# Patient Record
Sex: FEMALE | URBAN - METROPOLITAN AREA
[De-identification: names, ages, dates, MRNs, and addresses within clinical notes are randomized per-mention and may not be internally consistent; named-entity substitution may affect disease eponyms.]

---

## 2017-03-22 PROBLEM — Z00.00 ENCOUNTER FOR PREVENTIVE HEALTH EXAMINATION: Status: ACTIVE | Noted: 2017-03-22

## 2023-07-29 ENCOUNTER — INPATIENT (INPATIENT)
Facility: HOSPITAL | Age: 78
LOS: 1 days | Discharge: HOME | End: 2023-07-31
Attending: HOSPITALIST | Admitting: HOSPITALIST
Payer: MEDICARE

## 2023-07-29 VITALS
TEMPERATURE: 98 F | HEIGHT: 63 IN | DIASTOLIC BLOOD PRESSURE: 85 MMHG | SYSTOLIC BLOOD PRESSURE: 176 MMHG | HEART RATE: 70 BPM | WEIGHT: 214.07 LBS | RESPIRATION RATE: 18 BRPM | OXYGEN SATURATION: 96 %

## 2023-07-29 LAB
ALBUMIN SERPL ELPH-MCNC: 3.4 G/DL — SIGNIFICANT CHANGE UP (ref 3.3–5)
ALP SERPL-CCNC: 96 U/L — SIGNIFICANT CHANGE UP (ref 40–120)
ALT FLD-CCNC: 13 U/L — SIGNIFICANT CHANGE UP (ref 12–78)
ANION GAP SERPL CALC-SCNC: 5 MMOL/L — SIGNIFICANT CHANGE UP (ref 5–17)
ANISOCYTOSIS BLD QL: SLIGHT — SIGNIFICANT CHANGE UP
APPEARANCE UR: CLEAR — SIGNIFICANT CHANGE UP
AST SERPL-CCNC: 11 U/L — LOW (ref 15–37)
BACTERIA # UR AUTO: ABNORMAL
BASOPHILS # BLD AUTO: 0.03 K/UL — SIGNIFICANT CHANGE UP (ref 0–0.2)
BASOPHILS NFR BLD AUTO: 0.3 % — SIGNIFICANT CHANGE UP (ref 0–2)
BILIRUB SERPL-MCNC: 1.4 MG/DL — HIGH (ref 0.2–1.2)
BILIRUB UR-MCNC: NEGATIVE — SIGNIFICANT CHANGE UP
BUN SERPL-MCNC: 17 MG/DL — SIGNIFICANT CHANGE UP (ref 7–23)
CALCIUM SERPL-MCNC: 8.6 MG/DL — SIGNIFICANT CHANGE UP (ref 8.5–10.1)
CHLORIDE SERPL-SCNC: 112 MMOL/L — HIGH (ref 96–108)
CO2 SERPL-SCNC: 28 MMOL/L — SIGNIFICANT CHANGE UP (ref 22–31)
COLOR SPEC: YELLOW — SIGNIFICANT CHANGE UP
CREAT SERPL-MCNC: 0.81 MG/DL — SIGNIFICANT CHANGE UP (ref 0.5–1.3)
DIFF PNL FLD: ABNORMAL
EGFR: 75 ML/MIN/1.73M2 — SIGNIFICANT CHANGE UP
EOSINOPHIL # BLD AUTO: 0.03 K/UL — SIGNIFICANT CHANGE UP (ref 0–0.5)
EOSINOPHIL NFR BLD AUTO: 0.3 % — SIGNIFICANT CHANGE UP (ref 0–6)
EPI CELLS # UR: SIGNIFICANT CHANGE UP
GLUCOSE SERPL-MCNC: 146 MG/DL — HIGH (ref 70–99)
GLUCOSE UR QL: NEGATIVE MG/DL — SIGNIFICANT CHANGE UP
HCT VFR BLD CALC: 26.1 % — LOW (ref 34.5–45)
HGB BLD-MCNC: 9.4 G/DL — LOW (ref 11.5–15.5)
HYPOCHROMIA BLD QL: SLIGHT — SIGNIFICANT CHANGE UP
IMM GRANULOCYTES NFR BLD AUTO: 0.4 % — SIGNIFICANT CHANGE UP (ref 0–0.9)
KETONES UR-MCNC: NEGATIVE — SIGNIFICANT CHANGE UP
LACTATE SERPL-SCNC: 1 MMOL/L — SIGNIFICANT CHANGE UP (ref 0.7–2)
LEUKOCYTE ESTERASE UR-ACNC: NEGATIVE — SIGNIFICANT CHANGE UP
LYMPHOCYTES # BLD AUTO: 1.91 K/UL — SIGNIFICANT CHANGE UP (ref 1–3.3)
LYMPHOCYTES # BLD AUTO: 16.4 % — SIGNIFICANT CHANGE UP (ref 13–44)
MACROCYTES BLD QL: SLIGHT — SIGNIFICANT CHANGE UP
MANUAL SMEAR VERIFICATION: SIGNIFICANT CHANGE UP
MCHC RBC-ENTMCNC: 23.7 PG — LOW (ref 27–34)
MCHC RBC-ENTMCNC: 36 G/DL — SIGNIFICANT CHANGE UP (ref 32–36)
MCV RBC AUTO: 65.9 FL — LOW (ref 80–100)
MICROCYTES BLD QL: SIGNIFICANT CHANGE UP
MONOCYTES # BLD AUTO: 0.6 K/UL — SIGNIFICANT CHANGE UP (ref 0–0.9)
MONOCYTES NFR BLD AUTO: 5.2 % — SIGNIFICANT CHANGE UP (ref 2–14)
NEUTROPHILS # BLD AUTO: 9 K/UL — HIGH (ref 1.8–7.4)
NEUTROPHILS NFR BLD AUTO: 77.4 % — HIGH (ref 43–77)
NITRITE UR-MCNC: NEGATIVE — SIGNIFICANT CHANGE UP
NRBC # BLD: 0 /100 WBCS — SIGNIFICANT CHANGE UP (ref 0–0)
OVALOCYTES BLD QL SMEAR: SLIGHT — SIGNIFICANT CHANGE UP
PH UR: 6 — SIGNIFICANT CHANGE UP (ref 5–8)
PLAT MORPH BLD: NORMAL — SIGNIFICANT CHANGE UP
PLATELET # BLD AUTO: 246 K/UL — SIGNIFICANT CHANGE UP (ref 150–400)
POLYCHROMASIA BLD QL SMEAR: SLIGHT — SIGNIFICANT CHANGE UP
POTASSIUM SERPL-MCNC: 3.6 MMOL/L — SIGNIFICANT CHANGE UP (ref 3.5–5.3)
POTASSIUM SERPL-SCNC: 3.6 MMOL/L — SIGNIFICANT CHANGE UP (ref 3.5–5.3)
PROT SERPL-MCNC: 6.5 GM/DL — SIGNIFICANT CHANGE UP (ref 6–8.3)
PROT UR-MCNC: NEGATIVE MG/DL — SIGNIFICANT CHANGE UP
RBC # BLD: 3.96 M/UL — SIGNIFICANT CHANGE UP (ref 3.8–5.2)
RBC # FLD: 17.6 % — HIGH (ref 10.3–14.5)
RBC BLD AUTO: ABNORMAL
RBC CASTS # UR COMP ASSIST: ABNORMAL /HPF (ref 0–4)
SODIUM SERPL-SCNC: 145 MMOL/L — SIGNIFICANT CHANGE UP (ref 135–145)
SP GR SPEC: 1.01 — SIGNIFICANT CHANGE UP (ref 1.01–1.02)
UROBILINOGEN FLD QL: NEGATIVE MG/DL — SIGNIFICANT CHANGE UP
WBC # BLD: 11.83 K/UL — HIGH (ref 3.8–10.5)
WBC # FLD AUTO: 11.83 K/UL — HIGH (ref 3.8–10.5)
WBC UR QL: SIGNIFICANT CHANGE UP

## 2023-07-29 PROCEDURE — 93010 ELECTROCARDIOGRAM REPORT: CPT

## 2023-07-29 PROCEDURE — 74177 CT ABD & PELVIS W/CONTRAST: CPT | Mod: 26,MC

## 2023-07-29 PROCEDURE — 99222 1ST HOSP IP/OBS MODERATE 55: CPT

## 2023-07-29 PROCEDURE — 99285 EMERGENCY DEPT VISIT HI MDM: CPT

## 2023-07-29 RX ORDER — TAMSULOSIN HYDROCHLORIDE 0.4 MG/1
0.4 CAPSULE ORAL DAILY
Refills: 0 | Status: DISCONTINUED | OUTPATIENT
Start: 2023-07-29 | End: 2023-07-31

## 2023-07-29 RX ORDER — MORPHINE SULFATE 50 MG/1
4 CAPSULE, EXTENDED RELEASE ORAL ONCE
Refills: 0 | Status: DISCONTINUED | OUTPATIENT
Start: 2023-07-29 | End: 2023-07-29

## 2023-07-29 RX ORDER — HEPARIN SODIUM 5000 [USP'U]/ML
5000 INJECTION INTRAVENOUS; SUBCUTANEOUS EVERY 12 HOURS
Refills: 0 | Status: DISCONTINUED | OUTPATIENT
Start: 2023-07-29 | End: 2023-07-31

## 2023-07-29 RX ORDER — KETOROLAC TROMETHAMINE 30 MG/ML
15 SYRINGE (ML) INJECTION ONCE
Refills: 0 | Status: DISCONTINUED | OUTPATIENT
Start: 2023-07-29 | End: 2023-07-29

## 2023-07-29 RX ORDER — LIDOCAINE 4 G/100G
1 CREAM TOPICAL ONCE
Refills: 0 | Status: COMPLETED | OUTPATIENT
Start: 2023-07-29 | End: 2023-07-29

## 2023-07-29 RX ORDER — SODIUM CHLORIDE 9 MG/ML
1000 INJECTION INTRAMUSCULAR; INTRAVENOUS; SUBCUTANEOUS
Refills: 0 | Status: DISCONTINUED | OUTPATIENT
Start: 2023-07-29 | End: 2023-07-31

## 2023-07-29 RX ORDER — SENNA PLUS 8.6 MG/1
2 TABLET ORAL AT BEDTIME
Refills: 0 | Status: DISCONTINUED | OUTPATIENT
Start: 2023-07-29 | End: 2023-07-31

## 2023-07-29 RX ORDER — SODIUM CHLORIDE 9 MG/ML
1000 INJECTION INTRAMUSCULAR; INTRAVENOUS; SUBCUTANEOUS ONCE
Refills: 0 | Status: COMPLETED | OUTPATIENT
Start: 2023-07-29 | End: 2023-07-29

## 2023-07-29 RX ORDER — HYDROMORPHONE HYDROCHLORIDE 2 MG/ML
0.2 INJECTION INTRAMUSCULAR; INTRAVENOUS; SUBCUTANEOUS EVERY 6 HOURS
Refills: 0 | Status: DISCONTINUED | OUTPATIENT
Start: 2023-07-29 | End: 2023-07-30

## 2023-07-29 RX ORDER — ONDANSETRON 8 MG/1
4 TABLET, FILM COATED ORAL ONCE
Refills: 0 | Status: COMPLETED | OUTPATIENT
Start: 2023-07-29 | End: 2023-07-29

## 2023-07-29 RX ORDER — CEFTRIAXONE 500 MG/1
1000 INJECTION, POWDER, FOR SOLUTION INTRAMUSCULAR; INTRAVENOUS EVERY 24 HOURS
Refills: 0 | Status: DISCONTINUED | OUTPATIENT
Start: 2023-07-29 | End: 2023-07-31

## 2023-07-29 RX ORDER — HYDROMORPHONE HYDROCHLORIDE 2 MG/ML
0.5 INJECTION INTRAMUSCULAR; INTRAVENOUS; SUBCUTANEOUS EVERY 6 HOURS
Refills: 0 | Status: DISCONTINUED | OUTPATIENT
Start: 2023-07-29 | End: 2023-07-31

## 2023-07-29 RX ADMIN — CEFTRIAXONE 100 MILLIGRAM(S): 500 INJECTION, POWDER, FOR SOLUTION INTRAMUSCULAR; INTRAVENOUS at 16:00

## 2023-07-29 RX ADMIN — HYDROMORPHONE HYDROCHLORIDE 0.5 MILLIGRAM(S): 2 INJECTION INTRAMUSCULAR; INTRAVENOUS; SUBCUTANEOUS at 16:03

## 2023-07-29 RX ADMIN — MORPHINE SULFATE 4 MILLIGRAM(S): 50 CAPSULE, EXTENDED RELEASE ORAL at 09:19

## 2023-07-29 RX ADMIN — Medication 15 MILLIGRAM(S): at 08:13

## 2023-07-29 RX ADMIN — LIDOCAINE 1 PATCH: 4 CREAM TOPICAL at 08:14

## 2023-07-29 RX ADMIN — HEPARIN SODIUM 5000 UNIT(S): 5000 INJECTION INTRAVENOUS; SUBCUTANEOUS at 18:53

## 2023-07-29 RX ADMIN — HYDROMORPHONE HYDROCHLORIDE 0.5 MILLIGRAM(S): 2 INJECTION INTRAMUSCULAR; INTRAVENOUS; SUBCUTANEOUS at 15:52

## 2023-07-29 RX ADMIN — SODIUM CHLORIDE 75 MILLILITER(S): 9 INJECTION INTRAMUSCULAR; INTRAVENOUS; SUBCUTANEOUS at 23:04

## 2023-07-29 RX ADMIN — LIDOCAINE 1 PATCH: 4 CREAM TOPICAL at 19:49

## 2023-07-29 RX ADMIN — MORPHINE SULFATE 4 MILLIGRAM(S): 50 CAPSULE, EXTENDED RELEASE ORAL at 09:04

## 2023-07-29 RX ADMIN — SODIUM CHLORIDE 1000 MILLILITER(S): 9 INJECTION INTRAMUSCULAR; INTRAVENOUS; SUBCUTANEOUS at 11:51

## 2023-07-29 RX ADMIN — ONDANSETRON 4 MILLIGRAM(S): 8 TABLET, FILM COATED ORAL at 07:31

## 2023-07-29 RX ADMIN — SENNA PLUS 2 TABLET(S): 8.6 TABLET ORAL at 23:04

## 2023-07-29 RX ADMIN — MORPHINE SULFATE 4 MILLIGRAM(S): 50 CAPSULE, EXTENDED RELEASE ORAL at 07:29

## 2023-07-29 RX ADMIN — Medication 15 MILLIGRAM(S): at 08:28

## 2023-07-29 RX ADMIN — SODIUM CHLORIDE 1000 MILLILITER(S): 9 INJECTION INTRAMUSCULAR; INTRAVENOUS; SUBCUTANEOUS at 08:31

## 2023-07-29 RX ADMIN — MORPHINE SULFATE 4 MILLIGRAM(S): 50 CAPSULE, EXTENDED RELEASE ORAL at 07:44

## 2023-07-29 RX ADMIN — LIDOCAINE 1 PATCH: 4 CREAM TOPICAL at 22:49

## 2023-07-29 RX ADMIN — TAMSULOSIN HYDROCHLORIDE 0.4 MILLIGRAM(S): 0.4 CAPSULE ORAL at 17:20

## 2023-07-29 RX ADMIN — SODIUM CHLORIDE 1000 MILLILITER(S): 9 INJECTION INTRAMUSCULAR; INTRAVENOUS; SUBCUTANEOUS at 07:31

## 2023-07-29 RX ADMIN — SODIUM CHLORIDE 1000 MILLILITER(S): 9 INJECTION INTRAMUSCULAR; INTRAVENOUS; SUBCUTANEOUS at 10:51

## 2023-07-29 NOTE — H&P ADULT - HISTORY OF PRESENT ILLNESS
78F     PMH asthma .  gastric  by pass,  cholecystectomy        c/o    L flank pain w/ radiation into LLQ since 0330 this   am    reports pain awoke her from sleep, denies previous similar pain,    had   2 episodes  of   vomiting   denies  fevers.  h/a, dizziness,   cp/sob,  cough, diarrhea / constipation, dysuria, hematuria, leg pain / swelling   Denies recent travel, sick contacts

## 2023-07-29 NOTE — H&P ADULT - NSHPPHYSICALEXAM_GEN_ALL_CORE
T(C): 36.8 (07-29-23 @ 10:46), Max: 36.8 (07-29-23 @ 10:46)  HR: 55 (07-29-23 @ 10:46) (55 - 70)  BP: 170/53 (07-29-23 @ 10:46) (169/69 - 176/85)  RR: 17 (07-29-23 @ 10:46) (17 - 18)  SpO2: 98% (07-29-23 @ 10:46) (96% - 98%)  GENERAL: NAD, lying in bed comfortably  HEAD:  Atraumatic, normocephalic  EYES: EOMI, PERRLA, conjunctiva and sclera clear  NECK: Supple, trachea midline, no JVD  HEART: Regular rate and rhythm, no murmurs, rubs, or gallops  LUNGS: Unlabored respirations.  Clear to auscultation bilaterally, no crackles, wheezing, or rhonchi  ABDOMEN: Soft, nontender, nondistended, +BS/  mild  flank  discomfirt  EXTREMITIES: 2+ peripheral pulses bilaterally. No clubbing, cyanosis, or edema  NERVOUS SYSTEM:  A&Ox3, moving all extremities, no focal deficits   SKIN: No rashes or lesions

## 2023-07-29 NOTE — ED ADULT NURSE NOTE - OBJECTIVE STATEMENT
80 YO female, A&Ox4, BIBA c/o L-side lower back pain radiating to LLQ abd since 3pm, woke patient from sleep. +nausea,  pt vomited x1 prior to arrival in Ed. Pain is rated 10/10, There is no associated chest pain, SOB, or back/flank pain. Abdomen non-distended, tender to palpation, Normoactive bowel sounds, denies any recent illness, change in diet, foreign travel, or sick contacts. 80 YO female, A&Ox4, BIBA c/o L-side lower back pain radiating to LLQ abd since 3pm, woke patient from sleep. +nausea,  pt vomited x1 prior to arrival in Ed. Pain is rated 10/10, There is no associated chest pain, SOB, or back/flank pain. Abdomen non-distended, tender to palpation, Normoactive bowel sounds, denies any recent illness, change in diet, foreign travel, or sick contacts. Per patient, no medical hx and NKDA 82 YO female, A&Ox4, BIBA c/o L-side lower back pain radiating to LLQ abd since 3pm, woke patient from sleep. +nausea,  pt vomited x1 prior to arrival in Ed. Pain is rated 10/10, There is no associated chest pain, SOB, or back/flank pain. Abdomen non-distended, tender to palpation, Normoactive bowel sounds, denies any recent illness, change in diet, foreign travel, or sick contacts. Per patient, no medical hx and NKDA 82 YO female, A&Ox4, BIBA c/o L-side lower back pain radiating to LLQ abd since 3pm, woke patient from sleep. +nausea,  pt vomited x1 prior to arrival in Ed. Pain is rated 10/10. There is no associated chest pain, SOB. Abdomen non-distended, tender to palpation, Normoactive bowel sounds, denies any recent illness, change in diet, foreign travel, or sick contacts. Per patient, no medical hx and NKDA 80 YO female, A&Ox4, BIBA c/o L-side lower back pain radiating to LLQ abd since 3pm, woke patient from sleep. +nausea,  pt vomited x1 prior to arrival in Ed. Pain is rated 10/10. There is no associated chest pain, SOB. Abdomen non-distended, tender to palpation, Normoactive bowel sounds, denies any recent illness, change in diet, foreign travel, or sick contacts. Per patient, no medical hx and NKDA

## 2023-07-29 NOTE — PATIENT PROFILE ADULT - FALL HARM RISK - HARM RISK INTERVENTIONS
Assistance with ambulation/Assistance OOB with selected safe patient handling equipment/Communicate Risk of Fall with Harm to all staff/Discuss with provider need for PT consult/Monitor gait and stability/Provide patient with walking aids - walker, cane, crutches/Reinforce activity limits and safety measures with patient and family/Tailored Fall Risk Interventions/Visual Cue: Yellow wristband and red socks/Bed in lowest position, wheels locked, appropriate side rails in place/Call bell, personal items and telephone in reach/Instruct patient to call for assistance before getting out of bed or chair/Non-slip footwear when patient is out of bed/[place] to call system/Physically safe environment - no spills, clutter or unnecessary equipment/Purposeful Proactive Rounding/Room/bathroom lighting operational, light cord in reach Assistance with ambulation/Assistance OOB with selected safe patient handling equipment/Communicate Risk of Fall with Harm to all staff/Discuss with provider need for PT consult/Monitor gait and stability/Provide patient with walking aids - walker, cane, crutches/Reinforce activity limits and safety measures with patient and family/Tailored Fall Risk Interventions/Visual Cue: Yellow wristband and red socks/Bed in lowest position, wheels locked, appropriate side rails in place/Call bell, personal items and telephone in reach/Instruct patient to call for assistance before getting out of bed or chair/Non-slip footwear when patient is out of bed/[place] to call system/Physically safe environment - no spills, clutter or unnecessary equipment/Purposeful Proactive Rounding/Room/bathroom lighting operational, light cord in reach Assistance with ambulation/Assistance OOB with selected safe patient handling equipment/Communicate Risk of Fall with Harm to all staff/Discuss with provider need for PT consult/Monitor gait and stability/Provide patient with walking aids - walker, cane, crutches/Reinforce activity limits and safety measures with patient and family/Tailored Fall Risk Interventions/Visual Cue: Yellow wristband and red socks/Bed in lowest position, wheels locked, appropriate side rails in place/Call bell, personal items and telephone in reach/Instruct patient to call for assistance before getting out of bed or chair/Non-slip footwear when patient is out of bed/[place] to call system/Physically safe environment - no spills, clutter or unnecessary equipment/Purposeful Proactive Rounding/Room/bathroom lighting operational, light cord in reach

## 2023-07-29 NOTE — CONSULT NOTE ADULT - ASSESSMENT
78F with 4.5mm stone in left UVJ,     recommend IVF  flomax  strain urine  continue rocephin  discussed with Dr Marquez

## 2023-07-29 NOTE — H&P ADULT - NSHPLABSRESULTS_GEN_ALL_CORE
LABS:                        9.4    11.83 )-----------( 246      ( 2023 07:30 )             26.1         145  |  112<H>  |  17  ----------------------------<  146<H>  3.6   |  28  |  0.81    Ca    8.6      2023 07:30    TPro  6.5  /  Alb  3.4  /  TBili  1.4<H>  /  DBili  x   /  AST  11<L>  /  ALT  13  /  AlkPhos  96            Urinalysis Basic - ( 2023 11:54 )    Color: Yellow / Appearance: Clear / S.010 / pH: x  Gluc: x / Ketone: Negative  / Bili: Negative / Urobili: Negative mg/dL   Blood: x / Protein: Negative mg/dL / Nitrite: Negative   Leuk Esterase: Negative / RBC: 6-10 /HPF / WBC 0-2   Sq Epi: x / Non Sq Epi: x / Bacteria: Few      Lactate, Blood: 1.0 mmol/L ( @ 07:30)

## 2023-07-29 NOTE — H&P ADULT - ASSESSMENT
78F       h/o asthma .  gastric  by pass,  cholecystectomy        c/o    L flank pain w/ radiation into LLQ since 0330 this   am    had   2 episodes  of   vomiting   denies  fevers.  h/a, dizziness,   cp/sob,  cough, diarrhea / constipation, dysuria, hematuria, leg pain / swelling     admitted with flank pain.  from renal stone   Ct a/p,.  mild  left hydro , 4.5  mm  stone  left UVJ  stone, IPMN, . splenomegaly      urology called  by er        iv fluids/ pain meds  Anemia.  ? new    splenomegaly/  heme  eval on monday  HTN   on    enlapril  pancreatic   IPMN.  mri  abd   on dvt ppx/  s/q  heparin       rad< from: CT Abdomen and Pelvis w/ IV Cont (07.29.23 @ 08:38) >  PRESSION:  There is mild left hydroureteronephrosis with left perinephric and left   periureteral stranding secondary to an obstructing 4.5 mm stone in the   left UVJ.  Multiple lobular areas of low attenuation involving the pancreatic body   and tail interspersed with normal pancreatic parenchyma suggestive of   IPMN (intraductal papillary mucinous neoplasm). Recommend further   evaluation with MRI of the pancreas with and without intravenous contrast  --- End of Report ---              78F       h/o asthma .  gastric  by pass,  cholecystectomy        c/o    L flank pain w/ radiation into LLQ since 0330 this   am    had   2 episodes  of   vomiting   denies  fevers.  h/a, dizziness,   cp/sob,  cough, diarrhea / constipation, dysuria, hematuria, leg pain / swelling     admitted with flank pain.  from renal stone   Ct a/p,.  mild  left hydro , 4.5  mm  stone  left UVJ  stone, IPMN, . splenomegaly      urology called  by er        iv fluids/ pain meds/  iv rocephin , for   nick nephric  stranding  Anemia.  ? new    iron studies,   stool guiac    splenomegaly/  heme  eval on monday  HTN   on    enlapril  pancreatic   IPMN.  mri  abd   on dvt ppx/  s/q  heparin       rad< from: CT Abdomen and Pelvis w/ IV Cont (07.29.23 @ 08:38) >  PRESSION:  There is mild left hydroureteronephrosis with left perinephric and left   periureteral stranding secondary to an obstructing 4.5 mm stone in the   left UVJ.  Multiple lobular areas of low attenuation involving the pancreatic body   and tail interspersed with normal pancreatic parenchyma suggestive of   IPMN (intraductal papillary mucinous neoplasm). Recommend further   evaluation with MRI of the pancreas with and without intravenous contrast  --- End of Report ---

## 2023-07-29 NOTE — ED PROVIDER NOTE - PHYSICAL EXAMINATION
GEN: Awake, alert, interactive, pt appears uncomfortable / moaning in pain.   HEAD AND NECK: NC/AT. Airway patent. Neck supple.   EYES:  Clear b/l. EOMI. PERRL.   ENT: Moist mucus membranes.   CARDIAC: Regular rate, regular rhythm. No evident pedal edema.    RESP/CHEST: Normal respiratory effort with no use of accessory muscles or retractions. Clear throughout on auscultation.  ABD: Soft, non-distended,+ TTP LLQ. No rebound, no guarding.   BACK: No midline spinal TTP. + L CVAT.   EXTREMITIES: Moving all extremities with no apparent deformities.   SKIN: Warm, dry, intact normal color. No rash.   NEURO: AOx3, CN II-XII grossly intact, no focal deficits.   PSYCH: Appropriate mood and affect.

## 2023-07-29 NOTE — ED ADULT TRIAGE NOTE - NS ED NURSE AMBULANCES
[facility], Ambulance Department [facility], Ambulance Department [facility] [place], Ambulance Department

## 2023-07-29 NOTE — ED PROVIDER NOTE - OBJECTIVE STATEMENT
78F PMH asthma BIBEMS d/t severe L flank pain w/ radiation into LLQ since 330 this AM. Pt reports pain awoke her from sleep, denies previous similar pain, pain 10/10, constant. Pt reports 2 episodes NBNB emesis. Denies F/C, h/a, dizziness, CP, SOB, cough, diarrhea / constipation, dysuria, hematuria, LE pain / swelling. Denies recent travel, sick contacts, new / spoiled foods.     PMH as above, PSH gastric bypass, megha, , 'tumor removal,' NKDA, meds as listed.

## 2023-07-29 NOTE — H&P ADULT - NSHPREVIEWOFSYSTEMS_GEN_ALL_CORE
REVIEW OF SYSTEMS:  CONSTITUTIONAL: No fever,  no  weight loss  ENT:  No  tinnitus,   no   vertigo  NECK: No pain or stiffness  RESPIRATORY: No cough, wheezing, chills or hemoptysis;    No Shortness of Breath  CARDIOVASCULAR: No chest pain, palpitations, dizziness  GASTROINTESTINAL: No abdominal or epigastric pain. No nausea, vomiting, or hematemesis; No diarrhea  No melena or hematochezia.  GENITOURINARY: No dysuria, frequency, hematuria, or incontinence/  +  flank pain  NEUROLOGICAL: No headaches  SKIN: No itching,  no   rash  LYMPH Nodes: No enlarged glands  ENDOCRINE: No heat or cold intolerance  MUSCULOSKELETAL: No joint pain or swelling  PSYCHIATRIC: No depression, anxiety  HEME/LYMPH: No easy bruising, or bleeding gums  ALLERGY AND IMMUNOLOGIC: No hives or eczema

## 2023-07-29 NOTE — ED ADULT NURSE NOTE - NSFALLUNIVINTERV_ED_ALL_ED
Bed/Stretcher in lowest position, wheels locked, appropriate side rails in place/Call bell, personal items and telephone in reach/Instruct patient to call for assistance before getting out of bed/chair/stretcher/Non-slip footwear applied when patient is off stretcher/[place] to call system/Physically safe environment - no spills, clutter or unnecessary equipment/Purposeful proactive rounding/Room/bathroom lighting operational, light cord in reach Bed/Stretcher in lowest position, wheels locked, appropriate side rails in place/Call bell, personal items and telephone in reach/Instruct patient to call for assistance before getting out of bed/chair/stretcher/Non-slip footwear applied when patient is off stretcher/[place] to call system/Physically safe environment - no spills, clutter or unnecessary equipment/Purposeful proactive rounding/Room/bathroom lighting operational, light cord in reach Bed/Stretcher in lowest position, wheels locked, appropriate side rails in place/Call bell, personal items and telephone in reach/Instruct patient to call for assistance before getting out of bed/chair/stretcher/Non-slip footwear applied when patient is off stretcher/[place] to call system/Physically safe environment - no spills, clutter or unnecessary equipment/Purposeful proactive rounding/Room/bathroom lighting operational, light cord in reach

## 2023-07-29 NOTE — ED ADULT TRIAGE NOTE - CHIEF COMPLAINT QUOTE
BIBA,  pt c/o L-side lower back pain radiating to LLQ abd since 3pm, woke patient from sleep. +nausea,  pt vomited x1 prior to arrival in Ed.

## 2023-07-29 NOTE — CONSULT NOTE ADULT - SUBJECTIVE AND OBJECTIVE BOX
Chief Complaint:  Patient is a 78y old  Female who presents with a chief complaint of flank pain (2023 14:07)    HPI:  78F PMH asthma gastric  by pass, cholecystectomy c/o L flank pain w/ radiation into LLQ since 0330 this am  reports pain awoke her from sleep, denies previous similar pain, had 2 episodes  of vomiting denies  fevers.  h/a, dizziness,   cp/sob,  cough, diarrhea / constipation, dysuria, hematuria, leg pain / swelling   Denies recent travel, sick contacts   (2023 14:07)      PMH/PSH:PAST MEDICAL & SURGICAL HISTORY:      Allergies:  No Known Allergies      Medications:  cefTRIAXone   IVPB 1000 milliGRAM(s) IV Intermittent every 24 hours  enalapril 2.5 milliGRAM(s) Oral daily  heparin   Injectable 5000 Unit(s) SubCutaneous every 12 hours  HYDROmorphone  Injectable 0.2 milliGRAM(s) IV Push every 6 hours PRN  HYDROmorphone  Injectable 0.5 milliGRAM(s) IV Push every 6 hours PRN  senna 2 Tablet(s) Oral at bedtime  sodium chloride 0.9%. 1000 milliLiter(s) IV Continuous <Continuous>  tamsulosin 0.4 milliGRAM(s) Oral daily      REVIEW OF SYSTEMS:  All other review of systems is negative unless indicated above.    Relevant Family History:   FAMILY HISTORY:      Relevant Social History:  Denies ETOH or tobacco history    Physical Exam:    Vital Signs:  Vital Signs Last 24 Hrs  T(C): 36.6 (2023 18:45), Max: 37.2 (2023 15:49)  T(F): 97.9 (2023 18:45), Max: 98.9 (2023 15:49)  HR: 49 (2023 18:45) (49 - 70)  BP: 182/70 (2023 18:45) (121/67 - 182/70)  BP(mean): --  RR: 20 (2023 18:45) (17 - 20)  SpO2: 94% (2023 18:45) (94% - 98%)    Parameters below as of 2023 18:45  Patient On (Oxygen Delivery Method): room air      Daily Height in cm: 160.02 (2023 06:35)    Daily   GENERAL: NAD, lying in bed comfortably  HEAD:  Atraumatic, normocephalic  EYES: EOMI, PERRLA, conjunctiva and sclera clear  NECK: Supple, trachea midline, no JVD  HEART: Regular rate and rhythm, no murmurs, rubs, or gallops  LUNGS: Unlabored respirations.  Clear to auscultation bilaterally, no crackles, wheezing, or rhonchi  ABDOMEN: Soft, nontender, nondistended, +BS/  mild  flank  discomfirt  EXTREMITIES: 2+ peripheral pulses bilaterally. No clubbing, cyanosis, or edema  NERVOUS SYSTEM:  A&Ox3, moving all extremities, no focal deficits       Laboratory:                          9.4    11.83 )-----------( 246      ( 2023 07:30 )             26.1         145  |  112<H>  |  17  ----------------------------<  146<H>  3.6   |  28  |  0.81    Ca    8.6      2023 07:30    TPro  6.5  /  Alb  3.4  /  TBili  1.4<H>  /  DBili  x   /  AST  11<L>  /  ALT  13  /  AlkPhos  96      LIVER FUNCTIONS - ( 2023 07:30 )  Alb: 3.4 g/dL / Pro: 6.5 gm/dL / ALK PHOS: 96 U/L / ALT: 13 U/L / AST: 11 U/L / GGT: x             Urinalysis Basic - ( 2023 11:54 )    Color: Yellow / Appearance: Clear / S.010 / pH: x  Gluc: x / Ketone: Negative  / Bili: Negative / Urobili: Negative mg/dL   Blood: x / Protein: Negative mg/dL / Nitrite: Negative   Leuk Esterase: Negative / RBC: 6-10 /HPF / WBC 0-2   Sq Epi: x / Non Sq Epi: x / Bacteria: Few          Intake and Output    23 @ 07:01  -  23 @ 20:02  --------------------------------------------------------  IN: 2000 mL / OUT: 800 mL / NET: 1200 mL        Imaging:    < from: CT Abdomen and Pelvis w/ IV Cont (23 @ 08:38) >  CONTRAST/COMPLICATIONS:  IV Contrast: Omnipaque 350  99 cc administered   1 cc discarded  Oral Contrast: NONE  Complications: None reported at time of study completion      PROCEDURE:  Axial CT images were acquired through the abdomen and pelvis following   the administration of intravenous contrast. Coronal and sagittal   reformatted images were generated.    LOWER CHEST: Visualized lung bases demonstrate mild dependent changes.  LIVER: Within normal limits.  BILE DUCTS: Normal caliber.  GALLBLADDER: Cholecystectomy.  SPLEEN: Enlarged spleen measures 14.8 cm CC.  PANCREAS: Multiple lobular areas of low attenuation involving the   pancreatic body and tail interspersed with normal pancreatic parenchyma   suggestive of IPMN.  ADRENALS: Within normal limits.  KIDNEYS/URETERS: There is mild left hydroureteronephrosis with left   perinephric and left periureteral stranding secondary to an obstructing   4.5 mm stone in the left UVJ.    BLADDER: No bladder wall thickening.  REPRODUCTIVE ORGANS: Hysterectomy.    BOWEL: Evaluation of bowel is limited without distention with oral   contrast, however there is no bowel obstruction. Colonic diverticulosis   without acute diverticulitis. Appendiceal stump is within normal limits.   Small bowel loops are not dilated. Status post gastric bypass surgery.   Stomach is underdistended for adequate evaluation.  PERITONEUM: No free air or significant ascites.  VESSELS:  No abdominal aortic aneurysm or dissection.  RETROPERITONEUM: No lymphadenopathy.  ABDOMINAL WALL: Small fat-containing umbilical hernia containing a wall   of nonobstructed/nonstrangulated small bowel. Fatty atrophy of the right   rectus abdominis muscle.  BONES: Mild degenerative changes.      IMPRESSION:    There is mild left hydroureteronephrosis with left perinephric and left   periureteral stranding secondary to an obstructing 4.5 mm stone in the   left UVJ.    Multiple lobular areas of low attenuation involving the pancreatic body   and tail interspersed with normal pancreatic parenchyma suggestive of   IPMN (intraductal papillary mucinous neoplasm). Recommend further   evaluation with MRI of the pancreas with and without intravenous contrast.      --- End of Report ---    < end of copied text >

## 2023-07-29 NOTE — ED PROVIDER NOTE - CLINICAL SUMMARY MEDICAL DECISION MAKING FREE TEXT BOX
78F PMH asthma BIBEMS d/t severe L flank pain w/ radiation into LLQ onset this AM. Afebrile, VSS. Pt appears uncomfortable / in pain. + L CVAT / LLQ TTP. Plan for CBC, CMP, lactate, UA/C, CT AP. Give IVF, Morphine, Zofran. Re-eval. 78F PMH asthma BIBEMS d/t severe L flank pain w/ radiation into LLQ onset this AM. Afebrile, VSS. Pt appears uncomfortable / in pain. + L CVAT / LLQ TTP. Plan for CBC, CMP, lactate, UA/C, CT AP. Give IVF, Morphine, Toradol, Zofran. Re-eval.  W/u significant for: + mild leukocytosis to 11.8, CT w/ 4.5mm obstructing stone w/in L UVJ. UA w/o evidence infection. On re-eval, pt w/ continued pain s/p 4mg Morphine & 15mg Toradol, additional medications ordered. Shared decision making w/ pt, family: Admission v d/c home w/ close outpatient Uro f/u. Pt & family prefer admission d/t pt age, pain control. Will admit to medicine (d/w Dr Cano), Uro consulted. Pt, family updated to results, admission. They understand / agree w/ this plan.

## 2023-07-30 LAB
ANION GAP SERPL CALC-SCNC: 2 MMOL/L — LOW (ref 5–17)
BUN SERPL-MCNC: 11 MG/DL — SIGNIFICANT CHANGE UP (ref 7–23)
CALCIUM SERPL-MCNC: 8.3 MG/DL — LOW (ref 8.5–10.1)
CHLORIDE SERPL-SCNC: 114 MMOL/L — HIGH (ref 96–108)
CK MB BLD-MCNC: <3.2 % — SIGNIFICANT CHANGE UP (ref 0–3.5)
CK MB CFR SERPL CALC: <1 NG/ML — SIGNIFICANT CHANGE UP (ref 0.5–3.6)
CK SERPL-CCNC: 31 U/L — SIGNIFICANT CHANGE UP (ref 26–192)
CO2 SERPL-SCNC: 30 MMOL/L — SIGNIFICANT CHANGE UP (ref 22–31)
CREAT SERPL-MCNC: 0.57 MG/DL — SIGNIFICANT CHANGE UP (ref 0.5–1.3)
CULTURE RESULTS: NO GROWTH — SIGNIFICANT CHANGE UP
EGFR: 94 ML/MIN/1.73M2 — SIGNIFICANT CHANGE UP
FERRITIN SERPL-MCNC: 57 NG/ML — SIGNIFICANT CHANGE UP (ref 13–330)
GLUCOSE SERPL-MCNC: 95 MG/DL — SIGNIFICANT CHANGE UP (ref 70–99)
HCT VFR BLD CALC: 23.6 % — LOW (ref 34.5–45)
HCV AB S/CO SERPL IA: 0.07 S/CO — SIGNIFICANT CHANGE UP (ref 0–0.99)
HCV AB SERPL-IMP: SIGNIFICANT CHANGE UP
HGB BLD-MCNC: 8.4 G/DL — LOW (ref 11.5–15.5)
IRON SATN MFR SERPL: 17 % — SIGNIFICANT CHANGE UP (ref 14–50)
IRON SATN MFR SERPL: 40 UG/DL — SIGNIFICANT CHANGE UP (ref 30–160)
MCHC RBC-ENTMCNC: 23.7 PG — LOW (ref 27–34)
MCHC RBC-ENTMCNC: 35.6 G/DL — SIGNIFICANT CHANGE UP (ref 32–36)
MCV RBC AUTO: 66.7 FL — LOW (ref 80–100)
NRBC # BLD: 0 /100 WBCS — SIGNIFICANT CHANGE UP (ref 0–0)
PLATELET # BLD AUTO: 213 K/UL — SIGNIFICANT CHANGE UP (ref 150–400)
POTASSIUM SERPL-MCNC: 3.8 MMOL/L — SIGNIFICANT CHANGE UP (ref 3.5–5.3)
POTASSIUM SERPL-SCNC: 3.8 MMOL/L — SIGNIFICANT CHANGE UP (ref 3.5–5.3)
RBC # BLD: 3.54 M/UL — LOW (ref 3.8–5.2)
RBC # FLD: 17.7 % — HIGH (ref 10.3–14.5)
SODIUM SERPL-SCNC: 146 MMOL/L — HIGH (ref 135–145)
SPECIMEN SOURCE: SIGNIFICANT CHANGE UP
TIBC SERPL-MCNC: 235 UG/DL — SIGNIFICANT CHANGE UP (ref 220–430)
TROPONIN I, HIGH SENSITIVITY RESULT: 18.6 NG/L — SIGNIFICANT CHANGE UP
TROPONIN I, HIGH SENSITIVITY RESULT: 31.1 NG/L — SIGNIFICANT CHANGE UP
UIBC SERPL-MCNC: 195 UG/DL — SIGNIFICANT CHANGE UP (ref 110–370)
WBC # BLD: 9.03 K/UL — SIGNIFICANT CHANGE UP (ref 3.8–10.5)
WBC # FLD AUTO: 9.03 K/UL — SIGNIFICANT CHANGE UP (ref 3.8–10.5)

## 2023-07-30 PROCEDURE — 99233 SBSQ HOSP IP/OBS HIGH 50: CPT

## 2023-07-30 PROCEDURE — 93010 ELECTROCARDIOGRAM REPORT: CPT

## 2023-07-30 RX ORDER — PREGABALIN 225 MG/1
1000 CAPSULE ORAL ONCE
Refills: 0 | Status: COMPLETED | OUTPATIENT
Start: 2023-07-30 | End: 2023-07-30

## 2023-07-30 RX ORDER — ALBUTEROL 90 UG/1
2 AEROSOL, METERED ORAL EVERY 6 HOURS
Refills: 0 | Status: DISCONTINUED | OUTPATIENT
Start: 2023-07-30 | End: 2023-07-31

## 2023-07-30 RX ORDER — ALBUTEROL 90 UG/1
2 AEROSOL, METERED ORAL EVERY 6 HOURS
Refills: 0 | Status: COMPLETED | OUTPATIENT
Start: 2023-07-30 | End: 2024-06-27

## 2023-07-30 RX ORDER — IRON SUCROSE 20 MG/ML
200 INJECTION, SOLUTION INTRAVENOUS ONCE
Refills: 0 | Status: COMPLETED | OUTPATIENT
Start: 2023-07-30 | End: 2023-07-30

## 2023-07-30 RX ORDER — FOLIC ACID 0.8 MG
1 TABLET ORAL ONCE
Refills: 0 | Status: COMPLETED | OUTPATIENT
Start: 2023-07-30 | End: 2023-07-30

## 2023-07-30 RX ORDER — PANTOPRAZOLE SODIUM 20 MG/1
40 TABLET, DELAYED RELEASE ORAL EVERY 12 HOURS
Refills: 0 | Status: DISCONTINUED | OUTPATIENT
Start: 2023-07-30 | End: 2023-07-31

## 2023-07-30 RX ORDER — SUCRALFATE 1 G
1 TABLET ORAL EVERY 6 HOURS
Refills: 0 | Status: DISCONTINUED | OUTPATIENT
Start: 2023-07-30 | End: 2023-07-31

## 2023-07-30 RX ADMIN — Medication 2.5 MILLIGRAM(S): at 05:18

## 2023-07-30 RX ADMIN — TAMSULOSIN HYDROCHLORIDE 0.4 MILLIGRAM(S): 0.4 CAPSULE ORAL at 12:18

## 2023-07-30 RX ADMIN — PANTOPRAZOLE SODIUM 40 MILLIGRAM(S): 20 TABLET, DELAYED RELEASE ORAL at 20:51

## 2023-07-30 RX ADMIN — IRON SUCROSE 200 MILLIGRAM(S): 20 INJECTION, SOLUTION INTRAVENOUS at 10:11

## 2023-07-30 RX ADMIN — HYDROMORPHONE HYDROCHLORIDE 0.5 MILLIGRAM(S): 2 INJECTION INTRAMUSCULAR; INTRAVENOUS; SUBCUTANEOUS at 10:05

## 2023-07-30 RX ADMIN — PANTOPRAZOLE SODIUM 40 MILLIGRAM(S): 20 TABLET, DELAYED RELEASE ORAL at 10:23

## 2023-07-30 RX ADMIN — Medication 1 MILLIGRAM(S): at 10:23

## 2023-07-30 RX ADMIN — HEPARIN SODIUM 5000 UNIT(S): 5000 INJECTION INTRAVENOUS; SUBCUTANEOUS at 05:18

## 2023-07-30 RX ADMIN — PREGABALIN 1000 MICROGRAM(S): 225 CAPSULE ORAL at 10:20

## 2023-07-30 RX ADMIN — Medication 30 MILLILITER(S): at 09:17

## 2023-07-30 RX ADMIN — SENNA PLUS 2 TABLET(S): 8.6 TABLET ORAL at 21:55

## 2023-07-30 RX ADMIN — CEFTRIAXONE 100 MILLIGRAM(S): 500 INJECTION, POWDER, FOR SOLUTION INTRAMUSCULAR; INTRAVENOUS at 16:17

## 2023-07-30 RX ADMIN — SODIUM CHLORIDE 100 MILLILITER(S): 9 INJECTION INTRAMUSCULAR; INTRAVENOUS; SUBCUTANEOUS at 23:30

## 2023-07-30 RX ADMIN — HYDROMORPHONE HYDROCHLORIDE 0.5 MILLIGRAM(S): 2 INJECTION INTRAMUSCULAR; INTRAVENOUS; SUBCUTANEOUS at 08:39

## 2023-07-30 RX ADMIN — Medication 1 GRAM(S): at 19:18

## 2023-07-30 RX ADMIN — HEPARIN SODIUM 5000 UNIT(S): 5000 INJECTION INTRAVENOUS; SUBCUTANEOUS at 18:23

## 2023-07-30 RX ADMIN — Medication 1 GRAM(S): at 10:55

## 2023-07-30 RX ADMIN — Medication 1 GRAM(S): at 23:30

## 2023-07-30 NOTE — PROGRESS NOTE ADULT - SUBJECTIVE AND OBJECTIVE BOX
Patient seen and examined  reports left flank pain  urinating  no blood  vitals stable  hb 8.4  on IVF  on IV rocephin for empiric therapy  Review of Systems:  General:denies fever chills, headache, weakness  HEENT: denies blurry vision,diffculty swallowing, difficulty hearing, tinnitus  Cardiovascular: denies chest pain  ,palpitations  Pulmonary:denies shortness of breath, cough, wheezing, hemoptysis  Gastrointestinal: denies abdominal pain, constipation, diarrhea,nausea , vomiting, hematochezia  : denies hematuria, dysuria, or incontinence  Neurological: denies weakness, numbness , tingling, dizziness, tremors  MSK: denies muscle pain, difficulty ambulating, swelling, back pain  skin: denies skin rash, itching, burning, or  skin lesions  Psychiatrical: denies mood disturbances, anxierty, feeling depressed, depression , or difficulty sleeping    Objective:  Vitals  T(C): 37 (07-30-23 @ 08:33), Max: 37.2 (07-29-23 @ 15:49)  HR: 58 (07-30-23 @ 08:33) (49 - 64)  BP: 166/73 (07-30-23 @ 08:33) (121/67 - 182/70)  RR: 17 (07-30-23 @ 08:33) (17 - 20)  SpO2: 92% (07-30-23 @ 08:33) (92% - 98%)    Physical Exam:  General: comfortable, no acute distress  HEENT: Atraumatic, no LAD, trachea midline, PERRLA  Cardiovascular: normal s1s2, no murmurs, gallops or fricition rubs  Pulmonary: clear to ausculation Bilaterally, no wheezing , rhonchi  Gastrointestinal: soft non tender non distended, no masses felt, no organomegally  Muscloskeletal: no lower extremity edema, intact bilateral lower extremity pulses  Neurological: CN II-12 intact. No focal weakness  Psychiatrical: normal mood, cooperative  SKIN: no rash, lesions or ulcers    Labs:                          8.4    9.03  )-----------( 213      ( 30 Jul 2023 07:20 )             23.6     07-30    146<H>  |  114<H>  |  11  ----------------------------<  95  3.8   |  30  |  0.57    Ca    8.3<L>      30 Jul 2023 07:20    TPro  6.5  /  Alb  3.4  /  TBili  1.4<H>  /  DBili  x   /  AST  11<L>  /  ALT  13  /  AlkPhos  96  07-29    LIVER FUNCTIONS - ( 29 Jul 2023 07:30 )  Alb: 3.4 g/dL / Pro: 6.5 gm/dL / ALK PHOS: 96 U/L / ALT: 13 U/L / AST: 11 U/L / GGT: x                 Active Medications  MEDICATIONS  (STANDING):  aluminum hydroxide/magnesium hydroxide/simethicone Suspension 30 milliLiter(s) Oral once  cefTRIAXone   IVPB 1000 milliGRAM(s) IV Intermittent every 24 hours  enalapril 2.5 milliGRAM(s) Oral daily  heparin   Injectable 5000 Unit(s) SubCutaneous every 12 hours  pantoprazole    Tablet 40 milliGRAM(s) Oral every 12 hours  senna 2 Tablet(s) Oral at bedtime  sodium chloride 0.9%. 1000 milliLiter(s) (75 mL/Hr) IV Continuous <Continuous>  sucralfate 1 Gram(s) Oral every 6 hours  tamsulosin 0.4 milliGRAM(s) Oral daily    MEDICATIONS  (PRN):  HYDROmorphone  Injectable 0.5 milliGRAM(s) IV Push every 6 hours PRN Severe Pain (7 - 10)  HYDROmorphone  Injectable 0.2 milliGRAM(s) IV Push every 6 hours PRN Moderate Pain (4 - 6)

## 2023-07-30 NOTE — PROGRESS NOTE ADULT - ASSESSMENT
77 yo F with h/o asthma .  gastric  by pass,  cholecystectomy  c/o    L flank pain w/ radiation into LLQ with   2 episodes  of   vomiting. Imaging consistent with left sided renal stone and  mild  left hydro and associated nick nephric  stranding. also found to be Anemic. admitted to Med /surg for further evaluation      LLQ pain  due to left sided nephrolithiases / left hydronephrosis and likely left sided pyelonephritis  Urology consulted  plan:  IVF  rocephim  monitor stone passage  flomax  f/u cultures    Chest/epigastric pain  doubtful ACS  EKG benign, trops pending  plan:  protonix carafate reassess    Anemia likely Fe deficiency  hb 8.4 now   no active bleeding  plan  start fe / b12/folic acid      HTN: enalapril    Pancreatic   IPMN. NO MRI in house , will touch base with GI if necessary in house    dvt ppx:   s/q  heparin                   79 yo F with h/o asthma .  gastric  by pass,  cholecystectomy  c/o    L flank pain w/ radiation into LLQ with   2 episodes  of   vomiting. Imaging consistent with left sided renal stone and  mild  left hydro and associated nick nephric  stranding. also found to be Anemic. admitted to Med /surg for further evaluation      LLQ pain  due to left sided nephrolithiases / left hydronephrosis and likely left sided pyelonephritis  Urology consulted  plan:  IVF  rocephim  monitor stone passage  flomax  f/u cultures    Chest/epigastric pain  doubtful ACS  EKG benign, trops pending  plan:  protonix carafate reassess    Anemia likely Fe deficiency  hb 8.4 now   no active bleeding  plan  start fe / b12/folic acid      HTN: enalapril    Pancreatic   IPMN. NO MRI in house , will touch base with GI if necessary in house    dvt ppx:   s/q  heparin

## 2023-07-30 NOTE — CHART NOTE - NSCHARTNOTEFT_GEN_A_CORE
Medicine PA Note    Notified by Rn that patient's IV was infiltrated and removed by the Daytime RN prior to shift starting. Patient complaining of pain above the site where IV was placed. Right antecubital fossa swollen with ecchymosis to area above,  2 small skin tear on either side of fossa , tenderness to palp , flex and ext of rt elbow decreased due to pain. Brachial pulse + 2  , radial pulse + 2 , Sensory Intact. Capillary refill of rt hand ,2 sec. Ice compresses and elevation. patient has pain medication ordered. will continue to monitor the patient.     [facility] Medicine PA Note    Notified by Rn that patient's IV was infiltrated and removed by the Daytime RN prior to shift starting. Patient complaining of pain above the site where IV was placed. Right antecubital fossa swollen with ecchymosis to area above,  2 small skin tear on either side of fossa , tenderness to palp , flex and ext of rt elbow decreased due to pain. Brachial pulse + 2  , radial pulse + 2 , Sensory Intact. Capillary refill of rt hand ,2 sec. Ice compresses and elevation. patient has pain medication ordered. will continue to monitor the patient.     [facility] Medicine PA Note    Notified by Rn that patient's IV was infiltrated and removed by the Daytime RN prior to shift starting. Patient complaining of pain above the site where IV was placed. Right antecubital fossa swollen with ecchymosis to area above,  2 small skin tear on either side of fossa , tenderness to palp , flex and ext of rt elbow decreased due to pain. Brachial pulse + 2  , radial pulse + 2 , Sensory Intact. Capillary refill of rt hand ,2 sec. Ice compresses and elevation. patient has pain medication ordered. will continue to monitor the patient.     [facility] Medicine PA Note    Notified by Rn that patient's IV was infiltrated and removed by the Daytime RN prior to shift starting. Patient complaining of pain above the site where IV was placed. Right antecubital fossa swollen with ecchymosis to area above,  2 small skin tear on either side of fossa , tenderness to palp , flex and ext of rt elbow decreased due to pain. Brachial pulse + 2  , radial pulse + 2 , Sensory Intact. Capillary refill of rt hand ,2 sec. Ice compresses and elevation. patient has pain medication ordered. will continue to monitor the patient.     [facility]    Addendum  Patient resting comfortable in bed . Patient able to move rt arm flex and extension, soft upper arm ,. pulses + 2 Brachial /Radial  . Sensory intact. cap refill < 2 sec  Patient as per Rn having no pain.  Will continue to monitor the patient.    [facility] Medicine PA Note    Notified by Rn that patient's IV was infiltrated and removed by the Daytime RN prior to shift starting. Patient complaining of pain above the site where IV was placed. Right antecubital fossa swollen with ecchymosis to area above,  2 small skin tear on either side of fossa , tenderness to palp , flex and ext of rt elbow decreased due to pain. Brachial pulse + 2  , radial pulse + 2 , Sensory Intact. Capillary refill of rt hand ,2 sec. Ice compresses and elevation. patient has pain medication ordered. will continue to monitor the patient.     [facility]    Addendum  Patient resting comfortable in bed . Patient able to move rt arm flex and extension, soft upper arm ,. pulses + 2 Brachial /Radial  . Sensory intact. cap refill < 2 sec  Patient as per Rn having no pain.  Will continue to monitor the patient.    [facility] Medicine PA Note    Notified by Rn that patient's IV was infiltrated and removed by the Daytime RN prior to shift starting. Patient complaining of pain above the site where IV was placed. Right antecubital fossa swollen with ecchymosis to area above,  2 small skin tear on either side of fossa , tenderness to palp , flex and ext of rt elbow decreased due to pain. Brachial pulse + 2  , radial pulse + 2 , Sensory Intact. Capillary refill of rt hand ,2 sec. Ice compresses and elevation. patient has pain medication ordered. will continue to monitor the patient.     [facility]    Addendum  Patient resting comfortable in bed . Patient able to move rt arm flex and extension, soft upper arm ,. pulses + 2 Brachial /Radial  . Sensory intact. cap refill < 2 sec  Patient as per Rn having no pain.  Will continue to monitor the patient.    [facility]

## 2023-07-30 NOTE — CHART NOTE - NSCHARTNOTEFT_GEN_A_CORE
Called by nurse to evaluate patient for episode of chest pain.  Patient c/o aching epigastric pain, non-radiating.  C/o mild nausea.  No dizziness, SOB, palpitations, abd pain, V/D.      Vital Signs Last 24 Hrs  T(C): 37 (30 Jul 2023 08:33), Max: 37.2 (29 Jul 2023 15:49)  T(F): 98.6 (30 Jul 2023 08:33), Max: 98.9 (29 Jul 2023 15:49)  HR: 58 (30 Jul 2023 08:33) (49 - 64)  BP: 166/73 (30 Jul 2023 08:33) (121/67 - 182/70)  BP(mean): --  RR: 17 (30 Jul 2023 08:33) (17 - 20)  SpO2: 92% (30 Jul 2023 08:33) (92% - 98%)    General: NAD  Lungs: CTAB  Cardiac: Clear S1, S1  Abdomen: soft, NT  Ext: no edema    EKG:  NSR @ 64 bpm, no ST-T changes    A/P:  79y/o F with PMHx of asthma, s/p gastric bypass and cholecystectomy admitted with 4.5mm stone in left UVJ.  Patient now with episode of epigastric pain.  -EKG with no acute ST-T changes  -Stat CE  -Will cont to monitor. Called by nurse to evaluate patient for episode of chest pain.  Patient c/o aching epigastric pain, non-radiating.  C/o mild nausea.  No dizziness, SOB, palpitations, abd pain, V/D.      Vital Signs Last 24 Hrs  T(C): 37 (30 Jul 2023 08:33), Max: 37.2 (29 Jul 2023 15:49)  T(F): 98.6 (30 Jul 2023 08:33), Max: 98.9 (29 Jul 2023 15:49)  HR: 58 (30 Jul 2023 08:33) (49 - 64)  BP: 166/73 (30 Jul 2023 08:33) (121/67 - 182/70)  BP(mean): --  RR: 17 (30 Jul 2023 08:33) (17 - 20)  SpO2: 92% (30 Jul 2023 08:33) (92% - 98%)    General: NAD  Lungs: CTAB  Cardiac: Clear S1, S1  Abdomen: soft, NT  Ext: no edema    EKG:  NSR @ 64 bpm, no ST-T changes    A/P:  77y/o F with PMHx of asthma, s/p gastric bypass and cholecystectomy admitted with 4.5mm stone in left UVJ.  Patient now with episode of epigastric pain.  -EKG with no acute ST-T changes  -Stat CE  -Will cont to monitor.

## 2023-07-31 ENCOUNTER — TRANSCRIPTION ENCOUNTER (OUTPATIENT)
Age: 78
End: 2023-07-31

## 2023-07-31 VITALS
TEMPERATURE: 99 F | SYSTOLIC BLOOD PRESSURE: 136 MMHG | DIASTOLIC BLOOD PRESSURE: 81 MMHG | OXYGEN SATURATION: 95 % | RESPIRATION RATE: 16 BRPM | HEART RATE: 85 BPM

## 2023-07-31 LAB
ANION GAP SERPL CALC-SCNC: 1 MMOL/L — LOW (ref 5–17)
BASOPHILS # BLD AUTO: 0.04 K/UL — SIGNIFICANT CHANGE UP (ref 0–0.2)
BASOPHILS NFR BLD AUTO: 0.4 % — SIGNIFICANT CHANGE UP (ref 0–2)
BUN SERPL-MCNC: 10 MG/DL — SIGNIFICANT CHANGE UP (ref 7–23)
CALCIUM SERPL-MCNC: 8.3 MG/DL — LOW (ref 8.5–10.1)
CHLORIDE SERPL-SCNC: 112 MMOL/L — HIGH (ref 96–108)
CO2 SERPL-SCNC: 31 MMOL/L — SIGNIFICANT CHANGE UP (ref 22–31)
CREAT SERPL-MCNC: 0.58 MG/DL — SIGNIFICANT CHANGE UP (ref 0.5–1.3)
CULTURE RESULTS: SIGNIFICANT CHANGE UP
EGFR: 94 ML/MIN/1.73M2 — SIGNIFICANT CHANGE UP
EOSINOPHIL # BLD AUTO: 0.11 K/UL — SIGNIFICANT CHANGE UP (ref 0–0.5)
EOSINOPHIL NFR BLD AUTO: 1 % — SIGNIFICANT CHANGE UP (ref 0–6)
GLUCOSE SERPL-MCNC: 100 MG/DL — HIGH (ref 70–99)
HCT VFR BLD CALC: 23.6 % — LOW (ref 34.5–45)
HGB BLD-MCNC: 8.5 G/DL — LOW (ref 11.5–15.5)
IMM GRANULOCYTES NFR BLD AUTO: 0.6 % — SIGNIFICANT CHANGE UP (ref 0–0.9)
LYMPHOCYTES # BLD AUTO: 19.5 % — SIGNIFICANT CHANGE UP (ref 13–44)
LYMPHOCYTES # BLD AUTO: 2.07 K/UL — SIGNIFICANT CHANGE UP (ref 1–3.3)
MAGNESIUM SERPL-MCNC: 2 MG/DL — SIGNIFICANT CHANGE UP (ref 1.6–2.6)
MCHC RBC-ENTMCNC: 24.1 PG — LOW (ref 27–34)
MCHC RBC-ENTMCNC: 36 G/DL — SIGNIFICANT CHANGE UP (ref 32–36)
MCV RBC AUTO: 67 FL — LOW (ref 80–100)
MONOCYTES # BLD AUTO: 0.92 K/UL — HIGH (ref 0–0.9)
MONOCYTES NFR BLD AUTO: 8.7 % — SIGNIFICANT CHANGE UP (ref 2–14)
NEUTROPHILS # BLD AUTO: 7.41 K/UL — HIGH (ref 1.8–7.4)
NEUTROPHILS NFR BLD AUTO: 69.8 % — SIGNIFICANT CHANGE UP (ref 43–77)
NRBC # BLD: 0 /100 WBCS — SIGNIFICANT CHANGE UP (ref 0–0)
PHOSPHATE SERPL-MCNC: 2.5 MG/DL — SIGNIFICANT CHANGE UP (ref 2.5–4.5)
PLATELET # BLD AUTO: 216 K/UL — SIGNIFICANT CHANGE UP (ref 150–400)
POTASSIUM SERPL-MCNC: 3.5 MMOL/L — SIGNIFICANT CHANGE UP (ref 3.5–5.3)
POTASSIUM SERPL-SCNC: 3.5 MMOL/L — SIGNIFICANT CHANGE UP (ref 3.5–5.3)
RBC # BLD: 3.52 M/UL — LOW (ref 3.8–5.2)
RBC # FLD: 17.8 % — HIGH (ref 10.3–14.5)
SODIUM SERPL-SCNC: 144 MMOL/L — SIGNIFICANT CHANGE UP (ref 135–145)
SPECIMEN SOURCE: SIGNIFICANT CHANGE UP
WBC # BLD: 10.61 K/UL — HIGH (ref 3.8–10.5)
WBC # FLD AUTO: 10.61 K/UL — HIGH (ref 3.8–10.5)

## 2023-07-31 PROCEDURE — 99239 HOSP IP/OBS DSCHRG MGMT >30: CPT

## 2023-07-31 PROCEDURE — 99232 SBSQ HOSP IP/OBS MODERATE 35: CPT

## 2023-07-31 RX ORDER — NIFEDIPINE 30 MG
1 TABLET, EXTENDED RELEASE 24 HR ORAL
Qty: 30 | Refills: 0
Start: 2023-07-31 | End: 2023-08-29

## 2023-07-31 RX ORDER — CEFUROXIME AXETIL 250 MG
1 TABLET ORAL
Qty: 10 | Refills: 0
Start: 2023-07-31 | End: 2023-08-04

## 2023-07-31 RX ORDER — HYDRALAZINE HCL 50 MG
25 TABLET ORAL ONCE
Refills: 0 | Status: COMPLETED | OUTPATIENT
Start: 2023-07-31 | End: 2023-07-31

## 2023-07-31 RX ORDER — ALBUTEROL 90 UG/1
2 AEROSOL, METERED ORAL
Qty: 0 | Refills: 0 | DISCHARGE
Start: 2023-07-31

## 2023-07-31 RX ORDER — NIFEDIPINE 30 MG
30 TABLET, EXTENDED RELEASE 24 HR ORAL DAILY
Refills: 0 | Status: DISCONTINUED | OUTPATIENT
Start: 2023-07-31 | End: 2023-07-31

## 2023-07-31 RX ORDER — LISINOPRIL 2.5 MG/1
10 TABLET ORAL DAILY
Refills: 0 | Status: DISCONTINUED | OUTPATIENT
Start: 2023-07-31 | End: 2023-07-31

## 2023-07-31 RX ORDER — TAMSULOSIN HYDROCHLORIDE 0.4 MG/1
1 CAPSULE ORAL
Qty: 14 | Refills: 0
Start: 2023-07-31 | End: 2023-08-13

## 2023-07-31 RX ADMIN — TAMSULOSIN HYDROCHLORIDE 0.4 MILLIGRAM(S): 0.4 CAPSULE ORAL at 14:26

## 2023-07-31 RX ADMIN — HYDROMORPHONE HYDROCHLORIDE 0.5 MILLIGRAM(S): 2 INJECTION INTRAMUSCULAR; INTRAVENOUS; SUBCUTANEOUS at 01:57

## 2023-07-31 RX ADMIN — SODIUM CHLORIDE 100 MILLILITER(S): 9 INJECTION INTRAMUSCULAR; INTRAVENOUS; SUBCUTANEOUS at 14:28

## 2023-07-31 RX ADMIN — Medication 2.5 MILLIGRAM(S): at 05:25

## 2023-07-31 RX ADMIN — PANTOPRAZOLE SODIUM 40 MILLIGRAM(S): 20 TABLET, DELAYED RELEASE ORAL at 05:26

## 2023-07-31 RX ADMIN — HYDROMORPHONE HYDROCHLORIDE 0.5 MILLIGRAM(S): 2 INJECTION INTRAMUSCULAR; INTRAVENOUS; SUBCUTANEOUS at 00:57

## 2023-07-31 RX ADMIN — LISINOPRIL 10 MILLIGRAM(S): 2.5 TABLET ORAL at 14:27

## 2023-07-31 RX ADMIN — Medication 1 GRAM(S): at 17:53

## 2023-07-31 RX ADMIN — CEFTRIAXONE 100 MILLIGRAM(S): 500 INJECTION, POWDER, FOR SOLUTION INTRAMUSCULAR; INTRAVENOUS at 16:00

## 2023-07-31 RX ADMIN — SODIUM CHLORIDE 100 MILLILITER(S): 9 INJECTION INTRAMUSCULAR; INTRAVENOUS; SUBCUTANEOUS at 05:26

## 2023-07-31 RX ADMIN — HEPARIN SODIUM 5000 UNIT(S): 5000 INJECTION INTRAVENOUS; SUBCUTANEOUS at 05:26

## 2023-07-31 RX ADMIN — Medication 30 MILLIGRAM(S): at 14:27

## 2023-07-31 RX ADMIN — Medication 1 GRAM(S): at 14:26

## 2023-07-31 RX ADMIN — Medication 1 GRAM(S): at 05:26

## 2023-07-31 RX ADMIN — HEPARIN SODIUM 5000 UNIT(S): 5000 INJECTION INTRAVENOUS; SUBCUTANEOUS at 17:53

## 2023-07-31 RX ADMIN — Medication 25 MILLIGRAM(S): at 14:27

## 2023-07-31 RX ADMIN — PANTOPRAZOLE SODIUM 40 MILLIGRAM(S): 20 TABLET, DELAYED RELEASE ORAL at 17:54

## 2023-07-31 RX ADMIN — HYDROMORPHONE HYDROCHLORIDE 0.5 MILLIGRAM(S): 2 INJECTION INTRAMUSCULAR; INTRAVENOUS; SUBCUTANEOUS at 08:22

## 2023-07-31 NOTE — DIETITIAN INITIAL EVALUATION ADULT - PERTINENT LABORATORY DATA
07-31    144  |  112<H>  |  10  ----------------------------<  100<H>  3.5   |  31  |  0.58    Ca    8.3<L>      31 Jul 2023 05:55  Phos  2.5     07-31  Mg     2.0     07-31     No pertinent family history in first degree relatives

## 2023-07-31 NOTE — DIETITIAN INITIAL EVALUATION ADULT - ENERGY INTAKE
Pt reports fair appetite and PO intake during LOS due to drinking lots of fluids to flush out kidney stones.

## 2023-07-31 NOTE — DISCHARGE NOTE NURSING/CASE MANAGEMENT/SOCIAL WORK - NSDCPEFALRISK_GEN_ALL_CORE
For information on Fall & Injury Prevention, visit: [email or web address]-[email or web address]-mobility OR  [email or web address]://www.[email or web address]/news/fall-prevention-tips-[email or web address]-avoid-injury OR  https://www.cdc.gov/steadi/patient.html For information on Fall & Injury Prevention, visit: [email or web address]://[email or web address]/[email or web address]/fall-[email or web address]-[email or web address]-[email or web address]-health-[email or web address]-mobility OR  [email or web address]://[email or web address]/news/fall-[email or web address]-tips-to-avoid-injury OR  https://www.cdc.gov/steadi/patient.html For information on Fall & Injury Prevention, visit: https://www.[facility]/news/fall-prevention-protects-and-maintains-health-and-mobility OR  https://[email or web address].[email or web address]/news/fall-prevention-tips-to-avoid-injury OR  https://www.cdc.gov/steadi/patient.html

## 2023-07-31 NOTE — DIETITIAN INITIAL EVALUATION ADULT - PERTINENT MEDS FT
MEDICATIONS  (STANDING):  cefTRIAXone   IVPB 1000 milliGRAM(s) IV Intermittent every 24 hours  enalapril 2.5 milliGRAM(s) Oral daily  heparin   Injectable 5000 Unit(s) SubCutaneous every 12 hours  hydrALAZINE 25 milliGRAM(s) Oral once  lisinopril 10 milliGRAM(s) Oral daily  NIFEdipine XL 30 milliGRAM(s) Oral daily  pantoprazole    Tablet 40 milliGRAM(s) Oral every 12 hours  senna 2 Tablet(s) Oral at bedtime  sodium chloride 0.9%. 1000 milliLiter(s) (100 mL/Hr) IV Continuous <Continuous>  sucralfate 1 Gram(s) Oral every 6 hours  tamsulosin 0.4 milliGRAM(s) Oral daily    MEDICATIONS  (PRN):  albuterol    90 MICROgram(s) HFA Inhaler 2 Puff(s) Inhalation every 6 hours PRN Shortness of Breath and/or Wheezing

## 2023-07-31 NOTE — PROGRESS NOTE ADULT - SUBJECTIVE AND OBJECTIVE BOX
SUBJECTIVE:  Patient seen and examined at bedside.  No overnight events.  Patient is comfortable at this time. She reports lower back pain radiating down bilateral lower extremities earlier today, now resolved.  Per RN, urine has not been being strained.    VITALS  Vital Signs Last 24 Hrs  T(C): 37.1 (31 Jul 2023 10:15), Max: 37.2 (30 Jul 2023 23:33)  T(F): 98.7 (31 Jul 2023 10:15), Max: 98.9 (30 Jul 2023 23:33)  HR: 71 (31 Jul 2023 10:15) (70 - 83)  BP: 178/74 (31 Jul 2023 10:15) (147/71 - 179/90)  RR: 18 (31 Jul 2023 10:15) (17 - 18)  SpO2: 94% (31 Jul 2023 10:15) (93% - 97%)    Parameters below as of 31 Jul 2023 05:29  Patient On (Oxygen Delivery Method): room air    PHYSICAL EXAM  GENERAL:  Well-nourished, well-developed female lying comfortably in bed in [facility].  HEENT:  NC/AT. Sclera white. Mucous membranes moist.  CARDIO:  Regular rate and rhythm.  RESPIRATORY:  Nonlabored breathing, no accessory muscle use.  ABDOMEN:  Soft, nondistended, nontender. No suprapubic tenderness.  : External catheter in place with 800cc clear yellow urine in cannister.  SKIN:  No jaundice, pallor, or cyanosis  NEURO:  Alert; answers questions appropriately.    INTAKE & OUTPUT  I&O's Summary    30 Jul 2023 07:01  -  31 Jul 2023 07:00  --------------------------------------------------------  IN: 240 mL / OUT: 2000 mL / NET: -1760 mL    I&O's Detail    30 Jul 2023 07:01  -  31 Jul 2023 07:00  --------------------------------------------------------  IN:    Oral Fluid: 240 mL  Total IN: 240 mL    OUT:    Voided (mL): 2000 mL  Total OUT: 2000 mL    Total NET: -1760 mL    MEDICATIONS  MEDICATIONS  (STANDING):  cefTRIAXone   IVPB 1000 milliGRAM(s) IV Intermittent every 24 hours  enalapril 2.5 milliGRAM(s) Oral daily  heparin   Injectable 5000 Unit(s) SubCutaneous every 12 hours  hydrALAZINE 25 milliGRAM(s) Oral once  lisinopril 10 milliGRAM(s) Oral daily  NIFEdipine XL 30 milliGRAM(s) Oral daily  pantoprazole    Tablet 40 milliGRAM(s) Oral every 12 hours  senna 2 Tablet(s) Oral at bedtime  sodium chloride 0.9%. 1000 milliLiter(s) (100 mL/Hr) IV Continuous <Continuous>  sucralfate 1 Gram(s) Oral every 6 hours  tamsulosin 0.4 milliGRAM(s) Oral daily    MEDICATIONS  (PRN):  albuterol    90 MICROgram(s) HFA Inhaler 2 Puff(s) Inhalation every 6 hours PRN Shortness of Breath and/or Wheezing    LABS:                        8.5    10.61 )-----------( 216      ( 31 Jul 2023 05:55 )             23.6     07-31    144  |  112<H>  |  10  ----------------------------<  100<H>  3.5   |  31  |  0.58    Ca    8.3<L>      31 Jul 2023 05:55  Phos  2.5     07-31  Mg     2.0     07-31    Culture - Urine (collected 29 Jul 2023 20:30)  Source: Clean Catch Clean Catch (Midstream)  Final Report (31 Jul 2023 07:48):    <10,000 CFU/mL Normal Urogenital Nicole    Culture - Urine (collected 29 Jul 2023 11:54)  Source: Clean Catch Clean Catch (Midstream)  Final Report (30 Jul 2023 16:30):    No growth    ASSESSMENT & PLAN  78 year female with 4.5mm stone in Left UVJ.    - Strain urine for stone passage  - Continue flomax, antibiotics  - Maintain adequate hydration  - To be discussed with Dr. Corona SUBJECTIVE:  Patient seen and examined at bedside.  No overnight events.  Patient is comfortable at this time. She reports lower back pain radiating down bilateral lower extremities earlier today, now resolved.  Per RN, urine has not been being strained.    VITALS  Vital Signs Last 24 Hrs  T(C): 37.1 (31 Jul 2023 10:15), Max: 37.2 (30 Jul 2023 23:33)  T(F): 98.7 (31 Jul 2023 10:15), Max: 98.9 (30 Jul 2023 23:33)  HR: 71 (31 Jul 2023 10:15) (70 - 83)  BP: 178/74 (31 Jul 2023 10:15) (147/71 - 179/90)  RR: 18 (31 Jul 2023 10:15) (17 - 18)  SpO2: 94% (31 Jul 2023 10:15) (93% - 97%)    Parameters below as of 31 Jul 2023 05:29  Patient On (Oxygen Delivery Method): room air    PHYSICAL EXAM  GENERAL:  Well-nourished, well-developed female lying comfortably in bed in [facility].  HEENT:  NC/AT. Sclera white. Mucous membranes moist.  CARDIO:  Regular rate and rhythm.  RESPIRATORY:  Nonlabored breathing, no accessory muscle use.  ABDOMEN:  Soft, nondistended, nontender. No suprapubic tenderness.  : External catheter in place with 800cc clear yellow urine in cannister.  SKIN:  No jaundice, pallor, or cyanosis  NEURO:  Alert; answers questions appropriately.    INTAKE & OUTPUT  I&O's Summary    30 Jul 2023 07:01  -  31 Jul 2023 07:00  --------------------------------------------------------  IN: 240 mL / OUT: 2000 mL / NET: -1760 mL    I&O's Detail    30 Jul 2023 07:01  -  31 Jul 2023 07:00  --------------------------------------------------------  IN:    Oral Fluid: 240 mL  Total IN: 240 mL    OUT:    Voided (mL): 2000 mL  Total OUT: 2000 mL    Total NET: -1760 mL    MEDICATIONS  MEDICATIONS  (STANDING):  cefTRIAXone   IVPB 1000 milliGRAM(s) IV Intermittent every 24 hours  enalapril 2.5 milliGRAM(s) Oral daily  heparin   Injectable 5000 Unit(s) SubCutaneous every 12 hours  hydrALAZINE 25 milliGRAM(s) Oral once  lisinopril 10 milliGRAM(s) Oral daily  NIFEdipine XL 30 milliGRAM(s) Oral daily  pantoprazole    Tablet 40 milliGRAM(s) Oral every 12 hours  senna 2 Tablet(s) Oral at bedtime  sodium chloride 0.9%. 1000 milliLiter(s) (100 mL/Hr) IV Continuous <Continuous>  sucralfate 1 Gram(s) Oral every 6 hours  tamsulosin 0.4 milliGRAM(s) Oral daily    MEDICATIONS  (PRN):  albuterol    90 MICROgram(s) HFA Inhaler 2 Puff(s) Inhalation every 6 hours PRN Shortness of Breath and/or Wheezing    LABS:                        8.5    10.61 )-----------( 216      ( 31 Jul 2023 05:55 )             23.6     07-31    144  |  112<H>  |  10  ----------------------------<  100<H>  3.5   |  31  |  0.58    Ca    8.3<L>      31 Jul 2023 05:55  Phos  2.5     07-31  Mg     2.0     07-31    Culture - Urine (collected 29 Jul 2023 20:30)  Source: Clean Catch Clean Catch (Midstream)  Final Report (31 Jul 2023 07:48):    <10,000 CFU/mL Normal Urogenital Nicole    Culture - Urine (collected 29 Jul 2023 11:54)  Source: Clean Catch Clean Catch (Midstream)  Final Report (30 Jul 2023 16:30):    No growth    ASSESSMENT & PLAN  78 year female with 4.5mm stone in Left UVJ.    - Strain urine for stone passage  - Continue flomax, antibiotics  - Maintain adequate hydration  - To be discussed with Dr. Corona SUBJECTIVE:  Patient seen and examined at bedside.  No overnight events.  Patient is comfortable at this time. She reports lower back pain radiating down bilateral lower extremities earlier today, now resolved.  Per RN, urine has not been being strained.    VITALS  Vital Signs Last 24 Hrs  T(C): 37.1 (31 Jul 2023 10:15), Max: 37.2 (30 Jul 2023 23:33)  T(F): 98.7 (31 Jul 2023 10:15), Max: 98.9 (30 Jul 2023 23:33)  HR: 71 (31 Jul 2023 10:15) (70 - 83)  BP: 178/74 (31 Jul 2023 10:15) (147/71 - 179/90)  RR: 18 (31 Jul 2023 10:15) (17 - 18)  SpO2: 94% (31 Jul 2023 10:15) (93% - 97%)    Parameters below as of 31 Jul 2023 05:29  Patient On (Oxygen Delivery Method): room air    PHYSICAL EXAM  GENERAL:  Well-nourished, well-developed female lying comfortably in bed in [facility].  HEENT:  NC/AT. Sclera white. Mucous membranes moist.  CARDIO:  Regular rate and rhythm.  RESPIRATORY:  Nonlabored breathing, no accessory muscle use.  ABDOMEN:  Soft, nondistended, nontender. No suprapubic tenderness.  : External catheter in place with 800cc clear yellow urine in cannister.  SKIN:  No jaundice, pallor, or cyanosis  NEURO:  Alert; answers questions appropriately.    INTAKE & OUTPUT  I&O's Summary    30 Jul 2023 07:01  -  31 Jul 2023 07:00  --------------------------------------------------------  IN: 240 mL / OUT: 2000 mL / NET: -1760 mL    I&O's Detail    30 Jul 2023 07:01  -  31 Jul 2023 07:00  --------------------------------------------------------  IN:    Oral Fluid: 240 mL  Total IN: 240 mL    OUT:    Voided (mL): 2000 mL  Total OUT: 2000 mL    Total NET: -1760 mL    MEDICATIONS  MEDICATIONS  (STANDING):  cefTRIAXone   IVPB 1000 milliGRAM(s) IV Intermittent every 24 hours  enalapril 2.5 milliGRAM(s) Oral daily  heparin   Injectable 5000 Unit(s) SubCutaneous every 12 hours  hydrALAZINE 25 milliGRAM(s) Oral once  lisinopril 10 milliGRAM(s) Oral daily  NIFEdipine XL 30 milliGRAM(s) Oral daily  pantoprazole    Tablet 40 milliGRAM(s) Oral every 12 hours  senna 2 Tablet(s) Oral at bedtime  sodium chloride 0.9%. 1000 milliLiter(s) (100 mL/Hr) IV Continuous <Continuous>  sucralfate 1 Gram(s) Oral every 6 hours  tamsulosin 0.4 milliGRAM(s) Oral daily    MEDICATIONS  (PRN):  albuterol    90 MICROgram(s) HFA Inhaler 2 Puff(s) Inhalation every 6 hours PRN Shortness of Breath and/or Wheezing    LABS:                        8.5    10.61 )-----------( 216      ( 31 Jul 2023 05:55 )             23.6     07-31    144  |  112<H>  |  10  ----------------------------<  100<H>  3.5   |  31  |  0.58    Ca    8.3<L>      31 Jul 2023 05:55  Phos  2.5     07-31  Mg     2.0     07-31    Culture - Urine (collected 29 Jul 2023 20:30)  Source: Clean Catch Clean Catch (Midstream)  Final Report (31 Jul 2023 07:48):    <10,000 CFU/mL Normal Urogenital Nicole    Culture - Urine (collected 29 Jul 2023 11:54)  Source: Clean Catch Clean Catch (Midstream)  Final Report (30 Jul 2023 16:30):    No growth    ASSESSMENT & PLAN  78 year female with 4.5mm stone in Left UVJ.    - Strain urine for stone passage  - Continue flomax, antibiotics  - Maintain adequate hydration  - To be discussed with Dr. Corona SUBJECTIVE:  Patient seen and examined at bedside.  No overnight events.  Patient is comfortable at this time. She reports lower back pain radiating down bilateral lower extremities earlier today, now resolved.  Per RN, urine has not been being strained.    VITALS  Vital Signs Last 24 Hrs  T(C): 37.1 (31 Jul 2023 10:15), Max: 37.2 (30 Jul 2023 23:33)  T(F): 98.7 (31 Jul 2023 10:15), Max: 98.9 (30 Jul 2023 23:33)  HR: 71 (31 Jul 2023 10:15) (70 - 83)  BP: 178/74 (31 Jul 2023 10:15) (147/71 - 179/90)  RR: 18 (31 Jul 2023 10:15) (17 - 18)  SpO2: 94% (31 Jul 2023 10:15) (93% - 97%)    Parameters below as of 31 Jul 2023 05:29  Patient On (Oxygen Delivery Method): room air    PHYSICAL EXAM  GENERAL:  Well-nourished, well-developed female lying comfortably in bed in [facility].  HEENT:  NC/AT. Sclera white. Mucous membranes moist.  CARDIO:  Regular rate and rhythm.  RESPIRATORY:  Nonlabored breathing, no accessory muscle use.  ABDOMEN:  Soft, nondistended, nontender. No suprapubic tenderness.  : External catheter in place with 800cc clear yellow urine in cannister.  SKIN:  No jaundice, pallor, or cyanosis  NEURO:  Alert; answers questions appropriately.    INTAKE & OUTPUT  I&O's Summary    30 Jul 2023 07:01  -  31 Jul 2023 07:00  --------------------------------------------------------  IN: 240 mL / OUT: 2000 mL / NET: -1760 mL    I&O's Detail    30 Jul 2023 07:01  -  31 Jul 2023 07:00  --------------------------------------------------------  IN:    Oral Fluid: 240 mL  Total IN: 240 mL    OUT:    Voided (mL): 2000 mL  Total OUT: 2000 mL    Total NET: -1760 mL    MEDICATIONS  MEDICATIONS  (STANDING):  cefTRIAXone   IVPB 1000 milliGRAM(s) IV Intermittent every 24 hours  enalapril 2.5 milliGRAM(s) Oral daily  heparin   Injectable 5000 Unit(s) SubCutaneous every 12 hours  hydrALAZINE 25 milliGRAM(s) Oral once  lisinopril 10 milliGRAM(s) Oral daily  NIFEdipine XL 30 milliGRAM(s) Oral daily  pantoprazole    Tablet 40 milliGRAM(s) Oral every 12 hours  senna 2 Tablet(s) Oral at bedtime  sodium chloride 0.9%. 1000 milliLiter(s) (100 mL/Hr) IV Continuous <Continuous>  sucralfate 1 Gram(s) Oral every 6 hours  tamsulosin 0.4 milliGRAM(s) Oral daily    MEDICATIONS  (PRN):  albuterol    90 MICROgram(s) HFA Inhaler 2 Puff(s) Inhalation every 6 hours PRN Shortness of Breath and/or Wheezing    LABS:                        8.5    10.61 )-----------( 216      ( 31 Jul 2023 05:55 )             23.6     07-31    144  |  112<H>  |  10  ----------------------------<  100<H>  3.5   |  31  |  0.58    Ca    8.3<L>      31 Jul 2023 05:55  Phos  2.5     07-31  Mg     2.0     07-31    Culture - Urine (collected 29 Jul 2023 20:30)  Source: Clean Catch Clean Catch (Midstream)  Final Report (31 Jul 2023 07:48):    <10,000 CFU/mL Normal Urogenital Nicole    Culture - Urine (collected 29 Jul 2023 11:54)  Source: Clean Catch Clean Catch (Midstream)  Final Report (30 Jul 2023 16:30):    No growth    ASSESSMENT & PLAN  78 year female with 4.5mm stone in Left UVJ.    - Strain urine for stone passage  - Continue flomax  - Maintain adequate hydration  - No objection to d/c home today. May continue ceftin or cipro x 3 days.  - To be discussed with Dr. Corona SUBJECTIVE:  Patient seen and examined at bedside.  No overnight events.  Patient is comfortable at this time. She reports lower back pain radiating down bilateral lower extremities earlier today, now resolved.  Per RN, urine has not been being strained.    VITALS  Vital Signs Last 24 Hrs  T(C): 37.1 (31 Jul 2023 10:15), Max: 37.2 (30 Jul 2023 23:33)  T(F): 98.7 (31 Jul 2023 10:15), Max: 98.9 (30 Jul 2023 23:33)  HR: 71 (31 Jul 2023 10:15) (70 - 83)  BP: 178/74 (31 Jul 2023 10:15) (147/71 - 179/90)  RR: 18 (31 Jul 2023 10:15) (17 - 18)  SpO2: 94% (31 Jul 2023 10:15) (93% - 97%)    Parameters below as of 31 Jul 2023 05:29  Patient On (Oxygen Delivery Method): room air    PHYSICAL EXAM  GENERAL:  Well-nourished, well-developed female lying comfortably in bed in [facility].  HEENT:  NC/AT. Sclera white. Mucous membranes moist.  CARDIO:  Regular rate and rhythm.  RESPIRATORY:  Nonlabored breathing, no accessory muscle use.  ABDOMEN:  Soft, nondistended, nontender. No suprapubic tenderness.  : External catheter in place with 800cc clear yellow urine in cannister.  SKIN:  No jaundice, pallor, or cyanosis  NEURO:  Alert; answers questions appropriately.    INTAKE & OUTPUT  I&O's Summary    30 Jul 2023 07:01  -  31 Jul 2023 07:00  --------------------------------------------------------  IN: 240 mL / OUT: 2000 mL / NET: -1760 mL    I&O's Detail    30 Jul 2023 07:01  -  31 Jul 2023 07:00  --------------------------------------------------------  IN:    Oral Fluid: 240 mL  Total IN: 240 mL    OUT:    Voided (mL): 2000 mL  Total OUT: 2000 mL    Total NET: -1760 mL    MEDICATIONS  MEDICATIONS  (STANDING):  cefTRIAXone   IVPB 1000 milliGRAM(s) IV Intermittent every 24 hours  enalapril 2.5 milliGRAM(s) Oral daily  heparin   Injectable 5000 Unit(s) SubCutaneous every 12 hours  hydrALAZINE 25 milliGRAM(s) Oral once  lisinopril 10 milliGRAM(s) Oral daily  NIFEdipine XL 30 milliGRAM(s) Oral daily  pantoprazole    Tablet 40 milliGRAM(s) Oral every 12 hours  senna 2 Tablet(s) Oral at bedtime  sodium chloride 0.9%. 1000 milliLiter(s) (100 mL/Hr) IV Continuous <Continuous>  sucralfate 1 Gram(s) Oral every 6 hours  tamsulosin 0.4 milliGRAM(s) Oral daily    MEDICATIONS  (PRN):  albuterol    90 MICROgram(s) HFA Inhaler 2 Puff(s) Inhalation every 6 hours PRN Shortness of Breath and/or Wheezing    LABS:                        8.5    10.61 )-----------( 216      ( 31 Jul 2023 05:55 )             23.6     07-31    144  |  112<H>  |  10  ----------------------------<  100<H>  3.5   |  31  |  0.58    Ca    8.3<L>      31 Jul 2023 05:55  Phos  2.5     07-31  Mg     2.0     07-31    Culture - Urine (collected 29 Jul 2023 20:30)  Source: Clean Catch Clean Catch (Midstream)  Final Report (31 Jul 2023 07:48):    <10,000 CFU/mL Normal Urogenital Nicole    Culture - Urine (collected 29 Jul 2023 11:54)  Source: Clean Catch Clean Catch (Midstream)  Final Report (30 Jul 2023 16:30):    No growth    ASSESSMENT & PLAN  78 year female with 4.5mm stone in Left UVJ.    - Strain urine for stone passage  - Continue flomax  - Maintain adequate hydration  - No objection to d/c home today. May continue ceftin or cipro x 3 days.  - To be discussed with Dr. Corona SUBJECTIVE:  Patient seen and examined at bedside.  No overnight events.  Patient is comfortable at this time. She reports lower back pain radiating down bilateral lower extremities earlier today, now resolved.  Per RN, urine has not been being strained.    VITALS  Vital Signs Last 24 Hrs  T(C): 37.1 (31 Jul 2023 10:15), Max: 37.2 (30 Jul 2023 23:33)  T(F): 98.7 (31 Jul 2023 10:15), Max: 98.9 (30 Jul 2023 23:33)  HR: 71 (31 Jul 2023 10:15) (70 - 83)  BP: 178/74 (31 Jul 2023 10:15) (147/71 - 179/90)  RR: 18 (31 Jul 2023 10:15) (17 - 18)  SpO2: 94% (31 Jul 2023 10:15) (93% - 97%)    Parameters below as of 31 Jul 2023 05:29  Patient On (Oxygen Delivery Method): room air    PHYSICAL EXAM  GENERAL:  Well-nourished, well-developed female lying comfortably in bed in [facility].  HEENT:  NC/AT. Sclera white. Mucous membranes moist.  CARDIO:  Regular rate and rhythm.  RESPIRATORY:  Nonlabored breathing, no accessory muscle use.  ABDOMEN:  Soft, nondistended, nontender. No suprapubic tenderness.  : External catheter in place with 800cc clear yellow urine in cannister.  SKIN:  No jaundice, pallor, or cyanosis  NEURO:  Alert; answers questions appropriately.    INTAKE & OUTPUT  I&O's Summary    30 Jul 2023 07:01  -  31 Jul 2023 07:00  --------------------------------------------------------  IN: 240 mL / OUT: 2000 mL / NET: -1760 mL    I&O's Detail    30 Jul 2023 07:01  -  31 Jul 2023 07:00  --------------------------------------------------------  IN:    Oral Fluid: 240 mL  Total IN: 240 mL    OUT:    Voided (mL): 2000 mL  Total OUT: 2000 mL    Total NET: -1760 mL    MEDICATIONS  MEDICATIONS  (STANDING):  cefTRIAXone   IVPB 1000 milliGRAM(s) IV Intermittent every 24 hours  enalapril 2.5 milliGRAM(s) Oral daily  heparin   Injectable 5000 Unit(s) SubCutaneous every 12 hours  hydrALAZINE 25 milliGRAM(s) Oral once  lisinopril 10 milliGRAM(s) Oral daily  NIFEdipine XL 30 milliGRAM(s) Oral daily  pantoprazole    Tablet 40 milliGRAM(s) Oral every 12 hours  senna 2 Tablet(s) Oral at bedtime  sodium chloride 0.9%. 1000 milliLiter(s) (100 mL/Hr) IV Continuous <Continuous>  sucralfate 1 Gram(s) Oral every 6 hours  tamsulosin 0.4 milliGRAM(s) Oral daily    MEDICATIONS  (PRN):  albuterol    90 MICROgram(s) HFA Inhaler 2 Puff(s) Inhalation every 6 hours PRN Shortness of Breath and/or Wheezing    LABS:                        8.5    10.61 )-----------( 216      ( 31 Jul 2023 05:55 )             23.6     07-31    144  |  112<H>  |  10  ----------------------------<  100<H>  3.5   |  31  |  0.58    Ca    8.3<L>      31 Jul 2023 05:55  Phos  2.5     07-31  Mg     2.0     07-31    Culture - Urine (collected 29 Jul 2023 20:30)  Source: Clean Catch Clean Catch (Midstream)  Final Report (31 Jul 2023 07:48):    <10,000 CFU/mL Normal Urogenital Nicole    Culture - Urine (collected 29 Jul 2023 11:54)  Source: Clean Catch Clean Catch (Midstream)  Final Report (30 Jul 2023 16:30):    No growth    ASSESSMENT & PLAN  78 year female with 4.5mm stone in Left UVJ.    - Strain urine for stone passage  - Continue flomax  - Maintain adequate hydration  - No objection to d/c home today. May continue ceftin or cipro x 3 days.  - To be discussed with Dr. Corona

## 2023-07-31 NOTE — DISCHARGE NOTE NURSING/CASE MANAGEMENT/SOCIAL WORK - PATIENT PORTAL LINK FT
You can access the FollowMyHealth Patient Portal offered by [facility] by registering at the following website: [email or web address]. By joining [employer]’s FollowMyHealth portal, you will also be able to view your health information using other applications (apps) compatible with our system. You can access the FollowMyHealth Patient Portal offered by [facility] by registering at the following website: [email or web address]. By joining [employer]’s FollowMyHealth portal, you will also be able to view your health information using other applications (apps) compatible with our system. You can access the FollowMyHealth Patient Portal offered by [facility] by registering at the following website: [email or web address]. By joining [email or web address]’s FollowMyHealth portal, you will also be able to view your health information using other applications (apps) compatible with our system.

## 2023-07-31 NOTE — DIETITIAN INITIAL EVALUATION ADULT - OTHER INFO
Denies difficulty chewing/swallowing. States weight stable. Refuses offer of nutritional supplement. Pt with no questions/preferences at this time. Made aware RD remains available.

## 2023-07-31 NOTE — DISCHARGE NOTE PROVIDER - NSDCMRMEDTOKEN_GEN_ALL_CORE_FT
Albuterol (Eqv-Proventil HFA) 90 mcg/inh inhalation aerosol: 2 inhaled as needed for  shortness of breath and/or wheezing

## 2023-08-04 DIAGNOSIS — N13.6 PYONEPHROSIS: ICD-10-CM

## 2023-08-04 DIAGNOSIS — D50.9 IRON DEFICIENCY ANEMIA, UNSPECIFIED: ICD-10-CM

## 2023-08-04 DIAGNOSIS — Z98.84 BARIATRIC SURGERY STATUS: ICD-10-CM

## 2023-08-04 DIAGNOSIS — I10 ESSENTIAL (PRIMARY) HYPERTENSION: ICD-10-CM

## 2023-08-04 DIAGNOSIS — D13.6 BENIGN NEOPLASM OF PANCREAS: ICD-10-CM

## 2023-08-04 DIAGNOSIS — J45.909 UNSPECIFIED ASTHMA, UNCOMPLICATED: ICD-10-CM

## 2023-12-15 RX ORDER — ALBUTEROL 90 UG/1
2 AEROSOL, METERED ORAL
Refills: 0 | DISCHARGE